# Patient Record
Sex: MALE | Race: WHITE
[De-identification: names, ages, dates, MRNs, and addresses within clinical notes are randomized per-mention and may not be internally consistent; named-entity substitution may affect disease eponyms.]

---

## 2017-06-08 NOTE — CT
CT abdomen and pelvis

 

Technique: Multiple axial sections were obtained from above the dome 

of the diaphragm inferiorly through the pubic symphysis.  Intravenous 

and oral contrast was not utilized.  Study has been performed as a 

ureteral stone protocol.

 

Findings: Left ureter is mildly prominent.  Slight inflammatory change

 is seen around portions of the left ureter.  These findings are due 

to an obstructing stone within the distal left ureter at the UVJ 

measuring 3-4 mm.  No other abnormal calcification seen along the 

course of the ureters.

 

Small nonobstructing stone noted within the right kidney measuring 

less than 5 mm.  Small cyst noted within the lower right kidney 

measuring about 1 cm.  No additional abnormality is seen within the 

kidneys.

 

Visualized lung bases are clear.  Noncontrast appearance of the liver 

and spleen appears within normal limits.  Pancreas appears within 

normal limits.  Aorta shows no aneurysmal dilatation with mild 

atherosclerotic change which continues into the iliac vessels.  

Gallbladder shows no calcified gallstones.  No retroperitoneal 

adenopathy or mesenteric abnormalities are seen.  Appendix felt to be 

seen and is within normal limits.  No pelvic mass or adenopathy is 

noted.  Small fat-containing left inguinal hernia is noted.

 

Bone window settings were reviewed which appear within normal limits 

for the patient's age.

 

Impression:

1.  Slightly prominent size of the left ureter with mild periurethral 

inflammatory change.  These findings are caused by an obstructing 3-4 

mm distal left ureteral stone at the UVJ.

2.  Small cyst and small nonobstructing stone within the right kidney.

3.  Other incidental findings as described above.  

 

Agree with preliminary report issued by [employer] 

(preliminary report dictated on 06/08/17, 1:03 PM Central Time)

 

Diagnostic code #3

## 2017-06-08 NOTE — EDM.PDOC
ED HPI GENERAL MEDICAL PROBLEM





- General


Chief Complaint: Abdominal Pain


Stated Complaint: Flank pain, abdominal pain


Time Seen by Provider: 06/08/17 11:10


Source of Information: Reports: Patient, Old Records (Obtained from [facility]), 

RN Notes Reviewed


History Limitations: Reports: No Limitations





- History of Present Illness


INITIAL COMMENTS - FREE TEXT/NARRATIVE: 





66 year old male presents to the ED today with complaints of intermittent, 

colicky, severe, left flank pain and LLQ abdominal pain. The pain started on 

Monday after a fall. He fell backwards, hitting his mid back on a railroad tie. 

The pain has been intermittent since that time. He was initially evaluated on 

Monday of this week by Dr. Romero at [facility]. He had a CT 

scan done at that time and was told he had a kidney stone and hernias. He 

states "I guess CT scans don't lie but I don't believe that's what's going on. I

've never had kidney stones before." He called [facility] and was 

instructed to come to the ED for evaluation. He was discharged home with 

flexeril and norco. He took both around 9am this morning. He says he was doing 

well and his pain had mostly subsided until this morning at which time he had a 

sudden onset of pain followed by nausea and vomiting x3. He has pain with 

urination, frequency, and is voiding small amounts. He has not noticed any 

hematuria. He denies testicle pain. He reports some loose stools but says he 

took a stool softener due to concerns of constipation while taking pain 

medication. No fever or chills. 


  ** Left Abdominal


Pain Score (Numeric/FACES): 5





- Related Data


 Allergies











Allergy/AdvReac Type Severity Reaction Status Date / Time


 


No Known Allergies Allergy   Verified 06/08/17 11:03











Home Meds: 


 Home Meds





Acetaminophen/HYDROcodone [Norco 325-5 MG] 1 tab PO Q4H PRN #15 tablet 06/08/17 

[Rx]


Hydrocodone/Acetaminophen [Hydrocodon-Acetaminophen 5-325] 1 tab PO Q4H PRN 06/ 08/17 [History]











Past Medical History


HEENT History: Reports: Impaired Vision


Other HEENT History: glasses


Gastrointestinal History: Reports: Hemorrhoids


Genitourinary History: Reports: Renal Calculus


Musculoskeletal History: Reports: Arthritis


Neurological History: Reports: Concussion, Vertigo





- Past Surgical History


HEENT Surgical History: Reports: Tonsillectomy





Social & Family History





- Family History


Family Medical History: Noncontributory





- Tobacco Use


Smoking Status *Q: Never Smoker


Second Hand Smoke Exposure: No





- Caffeine Use


Caffeine Use: Reports: Coffee, Soda


Caffeine Use Comment: Daily





- Recreational Drug Use


Recreational Drug Use: No





ED ROS GENERAL





- Review of Systems


Review Of Systems: See Below


Constitutional: Reports: No Symptoms.  Denies: Fever, Chills, Diaphoresis


Respiratory: Reports: No Symptoms.  Denies: Shortness of Breath


Cardiovascular: Reports: No Symptoms.  Denies: Chest Pain


GI/Abdominal: Reports: Abdominal Pain, Diarrhea, Nausea, Vomiting


: Reports: Dysuria, Flank Pain, Frequency, Pain.  Denies: Hematuria


Musculoskeletal: Reports: Back Pain


Neurological: Reports: No Symptoms.  Denies: Numbness, Tingling, Weakness





ED EXAM, GI/ABD





- Physical Exam


Exam: See Below


Exam Limited By: No Limitations


General Appearance: Alert, WD/WN, Anxious, Mild Distress


Respiratory/Chest: No Respiratory Distress, Lungs Clear, Normal Breath Sounds, 

No Accessory Muscle Use, Chest Non-Tender


Cardiovascular: Regular Rate, Rhythm, No Murmur


GI/Abdominal: Normal Bowel Sounds, Soft, No Organomegaly, No Distention, 

Tenderness (mild tenderness with palpation to LLQ and LUQ, no guarding, no 

peritoneal signs ).  No: Guarding, Rebound, Rigidity


Back Exam: Normal Inspection, Full Range of Motion.  No: CVA Tenderness (L), 

CVA Tenderness (R), Paraspinal Tenderness, Vertebral Tenderness


Neurological: Alert, Oriented, Normal Cognition


Psychiatric: Flat Affect


Skin Exam: Warm, Dry, Intact





Course





- Vital Signs


Last Recorded V/S: 


 Last Vital Signs











Temp  97.7 F   06/08/17 10:56


 


Pulse  69   06/08/17 10:56


 


Resp  20   06/08/17 10:56


 


BP  124/88   06/08/17 10:56


 


Pulse Ox  95   06/08/17 10:56














- Orders/Labs/Meds


Orders: 


 Active Orders 24 hr











 Category Date Time Status


 


 Peripheral IV Care [RC] .AS DIRECTED Care  06/08/17 11:26 Active


 


 Abdomen Pelvis wo Cont [CT] Stat Exams  06/08/17 11:26 Taken


 


 Sodium Chloride 0.9% [Saline Flush] Med  06/08/17 11:26 Active





 10 ml FLUSH ASDIRECTED PRN   


 


 Peripheral IV Insertion Adult [OM.PC] Stat Oth  06/08/17 11:26 Ordered








 Medication Orders





Sodium Chloride (Saline Flush)  10 ml FLUSH ASDIRECTED PRN


   PRN Reason: Keep Vein Open


   Last Admin: 06/08/17 11:57  Dose: 10 ml








Labs: 


 Laboratory Tests











  06/08/17 06/08/17 06/08/17 Range/Units





  11:30 11:50 11:50 


 


WBC   9.24 H   (4.23-9.07)  K/mm3


 


RBC   4.51 L   (4.63-6.08)  M/mm3


 


Hgb   13.9   (13.7-17.5)  gm/L


 


Hct   40.3   (40.1-51.0)  %


 


MCV   89.4   (79.0-92.2)  fl


 


MCH   30.8   (25.7-32.2)  pg


 


MCHC   34.5   (32.2-35.5)  g/dl


 


RDW Std Deviation   40.7   (35.1-43.9)  fL


 


Plt Count   198   (163-337)  K/mm3


 


MPV   9.8   (9.4-12.3)  fl


 


Neut % (Auto)   79.7 H   (34.0-67.9)  %


 


Lymph % (Auto)   9.3 L   (21.8-53.1)  %


 


Mono % (Auto)   8.9   (5.3-12.2)  %


 


Eos % (Auto)   1.5   (0.8-7.0)  


 


Baso % (Auto)   0.5   (0.1-1.2)  %


 


Neut # (Auto)   7.36 H   (1.78-5.38)  K/mm3


 


Lymph # (Auto)   0.86 L   (1.32-3.57)  K/mm3


 


Mono # (Auto)   0.82   (0.30-0.82)  K/mm3


 


Eos # (Auto)   0.14   (0.04-0.54)  K/mm3


 


Baso # (Auto)   0.05   (0.01-0.08)  K/mm3


 


Manual Slide Review   Abnormal smear   


 


Sodium    138  (136-145)  mEq/L


 


Potassium    4.1  (3.5-5.1)  mEq/L


 


Chloride    105  ()  mEq/L


 


Carbon Dioxide    25  (21-32)  mEq/L


 


Anion Gap    12.1  (5-15)  


 


BUN    25 H  (7-18)  mg/dL


 


Creatinine    1.5 H  (0.7-1.3)  mg/dL


 


Est Cr Clr Drug Dosing    44.31  mL/min


 


Estimated GFR (MDRD)    47  (>60)  mL/min


 


BUN/Creatinine Ratio    16.7  (14-18)  


 


Glucose    120 H  ()  mg/dL


 


Calcium    8.5  (8.5-10.1)  mg/dL


 


Total Bilirubin    0.4  (0.2-1.0)  mg/dL


 


AST    21  (15-37)  U/L


 


ALT    20  (16-63)  U/L


 


Alkaline Phosphatase    94  ()  U/L


 


Total Protein    7.4  (6.4-8.2)  g/dl


 


Albumin    3.6  (3.4-5.0)  g/dl


 


Globulin    3.8  gm/dL


 


Albumin/Globulin Ratio    1.0  (1-2)  


 


Urine Color  Yellow    (Yellow)  


 


Urine Appearance  Clear    (Clear)  


 


Urine pH  7.0    (5.0-8.0)  


 


Ur Specific Gravity  1.020    (1.005-1.030)  


 


Urine Protein  Trace H    (Negative)  


 


Urine Glucose (UA)  Negative    (Negative)  


 


Urine Ketones  Negative    (Negative)  


 


Urine Occult Blood  Trace-intact H    (Negative)  


 


Urine Nitrite  Negative    (Negative)  


 


Urine Bilirubin  Negative    (Negative)  


 


Urine Urobilinogen  0.2    (0.2-1.0)  


 


Ur Leukocyte Esterase  Negative    (Negative)  


 


Urine RBC  5-10 H    (0-5)  /hpf


 


Urine WBC  0-5    (0-5)  /hpf


 


Ur Epithelial Cells  Not seen    (0-5)  /hpf


 


Urine Bacteria  Not seen    (FEW)  /hpf


 


Urine Mucus  Few    (FEW)  /hpf











Meds: 


Medications











Generic Name Dose Route Start Last Admin





  Trade Name Freq  PRN Reason Stop Dose Admin


 


Sodium Chloride  10 ml  06/08/17 11:26  06/08/17 11:57





  Saline Flush  FLUSH   10 ml





  ASDIRECTED PRN   Administration





  Keep Vein Open   














Discontinued Medications














Generic Name Dose Route Start Last Admin





  Trade Name Freq  PRN Reason Stop Dose Admin


 


Ketorolac Tromethamine  30 mg  06/08/17 11:26  06/08/17 11:58





  Toradol  IVPUSH  06/08/17 11:27  30 mg





  ONETIME ONE   Administration


 


Ondansetron HCl  4 mg  06/08/17 11:26  06/08/17 11:56





  Zofran  IVPUSH  06/08/17 11:27  4 mg





  ONETIME ONE   Administration














- Re-Assessments/Exams


Free Text/Narrative Re-Assessment/Exam: 





CT report obtained from [facility]. CT abdomen/pelvis was obtained with oral 

contrast only on 6/5/17. Impression:


No evidence of appendicitis, diverticulitis, or hydronephrosis. Small calyceal 

tip stone in the midpole of the right kidney. Small fat containing inguinal 

hernia is noted on the left greater than the right. 








06/08/17 1245


CBC is unremarkable. CMP reveals mildly elevated kidney function and glucose, 

otherwise normal. UA positive for hematuria. 





CT of abdomen/pelvis without contrast read by [facility], impression:


1. mild left perinephric stranding and hydronephrosis secondary to a 4mm 

calculus located at or just inside the left ureterovesical junction. 





Patient was notified of findings. Images of kidney stones was provided for 

education purposes. The patient was educated no supportive care and f/u 

instructions. He has about 5 pills left of [place] from his clinic visit on 

Monday. Will provide him with a refill. He was educated on safety precautions 

regarding opiate pain medication. Discharge instructions as documented. 





Departure





- Departure


Time of Disposition: 12:56


Disposition: Home, Self-Care 01


Condition: good


Clinical Impression: 


 Kidney stone on left side





Back strain


Qualifiers:


 Encounter type: initial encounter Qualified Code(s): S39.012A - Strain of 

muscle, fascia and tendon of lower back, initial encounter








- Discharge Information


Prescriptions: 


Acetaminophen/HYDROcodone [Norco 325-5 MG] 1 tab PO Q4H PRN #15 tablet


 PRN Reason: Pain


Instructions:  Kidney Stones, Easy-to-Read


Referrals: 


Lucius Mack MD [Primary Care Provider] - 


Forms:  ED Department Discharge


Additional Instructions: 


Drink at least 80 oz of water per day


Ibuprofen 600-800mg every 8 hours as needed for mild to moderate pain


Hydrocodone/apap 1-2 tabs every 4-6 hours as needed for more severe pain


Follow-up in clinic or ER if you have not passed the stone in 3-4 days 


Return to ER if you are unable to manage your pain at home 


No driving for at least 6-8 hours after taking your pain medication or muscle 

relaxer














- My Orders


Last 24 Hours: 


My Active Orders





06/08/17 11:26


Peripheral IV Care [RC] .AS DIRECTED 


Abdomen Pelvis wo Cont [CT] Stat 


Sodium Chloride 0.9% [Saline Flush]   10 ml FLUSH ASDIRECTED PRN 


Peripheral IV Insertion Adult [OM.PC] Stat 














- Assessment/Plan


Last 24 Hours: 


My Active Orders





06/08/17 11:26


Peripheral IV Care [RC] .AS DIRECTED 


Abdomen Pelvis wo Cont [CT] Stat 


Sodium Chloride 0.9% [Saline Flush]   10 ml FLUSH ASDIRECTED PRN 


Peripheral IV Insertion Adult [OM.PC] Stat

## 2018-12-27 ENCOUNTER — HOSPITAL ENCOUNTER (EMERGENCY)
Dept: HOSPITAL 41 - JD.ED | Age: 68
Discharge: HOME | End: 2018-12-27
Payer: COMMERCIAL

## 2018-12-27 VITALS — DIASTOLIC BLOOD PRESSURE: 111 MMHG | SYSTOLIC BLOOD PRESSURE: 141 MMHG
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
## 2018-12-27 DIAGNOSIS — S20.219A: Primary | ICD-10-CM

## 2018-12-27 PROCEDURE — 99283 EMERGENCY DEPT VISIT LOW MDM: CPT

## 2018-12-27 PROCEDURE — 71046 X-RAY EXAM CHEST 2 VIEWS: CPT

## 2018-12-27 NOTE — EDM.PDOC
ED HPI GENERAL MEDICAL PROBLEM





- General


Chief Complaint: Chest Pain


Stated Complaint: CHEST INJURY


Time Seen by Provider: 12/27/18 08:44


Source of Information: Reports: Patient, RN Notes Reviewed





- History of Present Illness


INITIAL COMMENTS - FREE TEXT/NARRATIVE: 





68-year-old male was pushing snow with a skid steer type  when he ran 

into the curb at the edge of a parking lot unexpectedly. He was going quite 

fast. This threw his chest against a protective bar that goes across the seat 

in front of him anterior to his chest.  He has had anterior chest discomfort 

since this happened worse with deep breathing and worse with certain types of 

motion. No abdominal pain nausea vomiting. No unusual weakness dizziness.


  ** Chest


Pain Score (Numeric/FACES): 4





- Related Data


 Allergies











Allergy/AdvReac Type Severity Reaction Status Date / Time


 


No Known Allergies Allergy   Verified 12/27/18 08:47











Home Meds: 


 Home Meds





. [No Known Home Meds]  12/27/18 [History]











Past Medical History


HEENT History: Reports: Impaired Vision


Other HEENT History: glasses


Gastrointestinal History: Reports: Hemorrhoids


Genitourinary History: Reports: Renal Calculus


Musculoskeletal History: Reports: Arthritis


Neurological History: Reports: Concussion, Vertigo





- Past Surgical History


HEENT Surgical History: Reports: Tonsillectomy





Social & Family History





- Family History


Family Medical History: Noncontributory





- Caffeine Use


Caffeine Use: Reports: Coffee, Soda


Caffeine Use Comment: Daily





ED ROS GENERAL





- Review of Systems


Review Of Systems: See Below


Constitutional: Denies: Diaphoresis


HEENT: Reports: No Symptoms


Respiratory: Reports: Pleuritic Chest Pain.  Denies: Shortness of Breath, 

Wheezing


Cardiovascular: Reports: Chest Pain


GI/Abdominal: Denies: Abdominal Pain (Anterior chest), Nausea, Vomiting


Musculoskeletal: Denies: Neck Pain, Back Pain, Joint Pain


Skin: Reports: No Symptoms


Neurological: Reports: No Symptoms.  Denies: Dizziness





ED EXAM, GENERAL





- Physical Exam


Exam: See Below


General Appearance: Alert, Mild Distress


Eye Exam: Bilateral Eye: PERRL


Throat/Mouth: Normal Inspection


Head: Atraumatic


Neck: Supple


Respiratory/Chest: No Respiratory Distress, Lungs Clear, Normal Breath Sounds, 

Other (There is mild tenderness of the anterior chest, no severe rib or sternal 

tenderness, no bruising or swelling visible)


Cardiovascular: Regular Rate, Rhythm


GI/Abdominal: Soft, Non-Tender.  No: Guarding, Rebound


Back Exam: No: CVA Tenderness (L), CVA Tenderness (R)


Extremities: Normal Inspection


Neurological: Alert, Oriented, No Motor/Sensory Deficits


Skin Exam: Warm, Dry, Normal Color





Course





- Vital Signs


Last Recorded V/S: 


 Last Vital Signs











Temp  98.1 F   12/27/18 10:25


 


Pulse  67   12/27/18 10:25


 


Resp  12   12/27/18 10:25


 


BP  141/111 H  12/27/18 10:25


 


Pulse Ox  97   12/27/18 10:25














- Orders/Labs/Meds


Orders: 


 Active Orders 24 hr











 Category Date Time Status


 


 CXR [Chest 2V] [CR] Stat Exams  12/27/18 09:05 Taken











Meds: 


Medications














Discontinued Medications














Generic Name Dose Route Start Last Admin





  Trade Name Odilon  PRN Reason Stop Dose Admin


 


Acetaminophen  975 mg  12/27/18 09:04  12/27/18 09:23





  Tylenol  PO  12/27/18 09:05  975 mg





  NOW ONE   Administration





     





     





     





     














- Re-Assessments/Exams


Free Text/Narrative Re-Assessment/Exam: 





12/27/18 17:18


Chest x-ray looks good, sats are good, he feels better after Tylenol given, CT 

of chest not clinically indicated at this time, he will return if symptoms 

worsen. Discharge instructions as documented.





Departure





- Departure


Time of Disposition: 09:49


Disposition: Home, Self-Care 01


Condition: Fair


Clinical Impression: 


Chest wall contusion


Qualifiers:


 Encounter type: initial encounter Laterality: unspecified laterality Qualified 

Code(s): S20.219A - Contusion of unspecified front wall of thorax, initial 

encounter





Instructions:  Chest Contusion, Adult, Easy-to-Read


Referrals: 


Lucius Mack MD [Primary Care Provider] - 


Forms:  ED Department Discharge


Additional Instructions: 


Rest, avoid heavy lifting, you have been given Tylenol while here in the ED, 

YOur chest Xray looks good. Continue Tylenol up to 3 times daily, you may take 

Advil or ibuprofen in between doses of Tylenol if needed for extra pain relief, 

may also alternate ice and heat as needed, follow-up clinic if not getting back 

to normal within 5-7 days as expected, return to ED as needed if symptoms 

worsening in any way.





- My Orders


Last 24 Hours: 


My Active Orders





12/27/18 09:05


CXR [Chest 2V] [CR] Stat 














- Assessment/Plan


Last 24 Hours: 


My Active Orders





12/27/18 09:05


CXR [Chest 2V] [CR] Stat

## 2018-12-28 NOTE — CR
Chest: PA and lateral views of the chest were obtained.

 

Comparison: No prior chest x-ray.

 

Heart size and mediastinum are normal.  Slight atelectasis is seen 

within the left base.  Lungs otherwise are clear.  Bony structures 

show slight degenerative spurring within the spine.  Mild kyphosis is 

present within the spine.  Diaphragms are slightly flattened on the 

lateral view suggesting mild emphysematous change.

 

Impression:

1.  Incidental findings as noted above.  Nothing acute is appreciated.

 

Diagnostic code #2

## 2020-02-26 NOTE — EDM.PDOCBH
ED HPI GENERAL MEDICAL PROBLEM





- General


Chief Complaint: Behavioral/Psych


Stated Complaint: ANXIETY AND SUICIDAL IDEATIONS


Time Seen by Provider: 02/26/20 13:03


Source of Information: Reports: Patient, RN Notes Reviewed


History Limitations: Reports: No Limitations





- History of Present Illness


INITIAL COMMENTS - FREE TEXT/NARRATIVE: 





Patient is a 69-year-old male who presents to the ED for the evaluation of some 

suicidal ideations.  The patient states that his wife recently passed away in 

September 2019, and she was his "life buddy".  He has been having an 

exceptionally rough time since the passing of his wife.  He also notes that he 

has been to 7 funerals since his wife has passed, and he he just is having a 

hard time coping with things at this time.  He notes that he has been having a 

lot of mental anguish, and also a lot of physical pain as he has arthritis, 

both knees are shot.  He does PT on a regular basis, this does not provide him 

with much relief.  The patient states that he had a neighbor who was worried 

about him, that took him or referred him to [facility] for management, but badly 

and sent him here for further evaluation.  Patient does state that he has 

access to multiple guns and knives at home, but he does not have a set plan or 

know how he would commit suicide if he were to do so.  He is not hearing or 

seeing things that are not there as well.  He states that he went over to the 

neighbor's house, just because he was not sure what to do anymore.  He is alert 

and oriented, calm and cooperative.  His primary care provider is Dr. Tadeo.


  ** Headache


Pain Score (Numeric/FACES): 6





- Related Data


 Allergies











Allergy/AdvReac Type Severity Reaction Status Date / Time


 


No Known Allergies Allergy   Verified 02/26/20 12:51











Home Meds: 


 Home Meds





LORazepam [Ativan] 1 mg PO BID 02/26/20 [History]


Rosuvastatin [Crestor] 2.5 mg PO DAILY 02/26/20 [History]


Sertraline [Zoloft] 50 mg PO BEDTIME 02/26/20 [History]











Past Medical History


HEENT History: Reports: Impaired Vision


Other HEENT History: glasses


Cardiovascular History: Reports: High Cholesterol


Gastrointestinal History: Reports: Hemorrhoids


Genitourinary History: Reports: Renal Calculus


Musculoskeletal History: Reports: Arthritis


Neurological History: Reports: Concussion, Vertigo


Psychiatric History: Reports: Anxiety, Depression, Suicidal Ideation





- Past Surgical History


HEENT Surgical History: Reports: Tonsillectomy





Social & Family History





- Family History


Family Medical History: Noncontributory





- Tobacco Use


Smoking Status *Q: Never Smoker





- Caffeine Use


Caffeine Use: Reports: Coffee


Caffeine Use Comment: Daily





- Recreational Drug Use


Recreational Drug Use: No





ED ROS GENERAL





- Review of Systems


Review Of Systems: See Below


Constitutional: Denies: Fever, Chills


HEENT: Denies: Throat Pain


Respiratory: Denies: Shortness of Breath


Cardiovascular: Denies: Chest Pain


GI/Abdominal: Denies: Abdominal Pain, Constipation, Diarrhea, Nausea, Vomiting


: Denies: Dysuria


Neurological: Reports: Headache.  Denies: Confusion


Psychiatric: Reports: Anxiety, Depression, Suicidal Ideation (with no concrete 

plan).  Denies: Hallucinations, Homicidal Ideation





ED EXAM, BEHAVIORAL HEALTH





- Physical Exam


Exam: See Below


Exam Limited By: No Limitations


General Appearance: Alert, WD/WN, No Apparent Distress


Eye Exam: Bilateral Eye: EOMI, Normal Inspection, PERRL


Ears: Normal External Exam


Nose: Normal Inspection


Throat/Mouth: Normal Inspection, Normal Lips, Normal Teeth, Normal Gums, Normal 

Oropharynx, Normal Voice, No Airway Compromise


Head: Atraumatic, Normocephalic


Neck: Normal Inspection


Respiratory/Chest: No Respiratory Distress, Lungs Clear, Normal Breath Sounds, 

No Accessory Muscle Use, Chest Non-Tender


Cardiovascular: Normal Peripheral Pulses, Regular Rate, Rhythm, No Murmur


GI/Abdominal: Normal Bowel Sounds, Soft, Non-Tender, No Distention, No Mass


Extremities: Normal Inspection, Normal Capillary Refill


Neurological: Alert, Normal Mood/Affect, CN II-XII Intact, Normal Cognition, 

Normal Gait, Normal Reflexes, No Motor/Sensory Deficits, Oriented x 3


Psychiatric: Alert, Normal Affect, Normal Cognition, Normal Mood, Oriented, 

Tearful (at times when he is recalling his wife's memory), Suicidal Thoughts.  

No: Suicidal Plan, Auditory Hallucinations, Visual Hallucinations, Paranoid 

Thoughts, Threatening Behavior


Skin Exam: Warm, Dry, Intact, Normal color, No rash





COURSE, BEHAVIORAL HEALTH COMP





- Course


Vital Signs: 


 Last Vital Signs











Temp  97.3 F   02/26/20 12:46


 


Pulse  66   02/26/20 12:46


 


Resp  16   02/26/20 12:46


 


BP  153/84 H  02/26/20 12:46


 


Pulse Ox  96   02/26/20 12:46











Orders, Labs, Meds: 


 Active Orders 24 hr











 Category Date Time Status


 


 EKG Documentation Completion [RC] STAT Care  02/26/20 13:03 Ordered


 


 DRUG SCREEN, URINE [URCHEM] Stat Lab  02/26/20 13:03 Ordered








 Laboratory Tests











  02/26/20 02/26/20 02/26/20 Range/Units





  13:15 13:15 13:15 


 


WBC  15.36 H    (4.23-9.07)  K/mm3


 


RBC  4.71    (4.63-6.08)  M/mm3


 


Hgb  14.6    (13.7-17.5)  gm/dl


 


Hct  41.9    (40.1-51.0)  %


 


MCV  89.0    (79.0-92.2)  fl


 


MCH  31.0    (25.7-32.2)  pg


 


MCHC  34.8    (32.2-35.5)  g/dl


 


RDW Std Deviation  42.6    (35.1-43.9)  fL


 


Plt Count  235    (163-337)  K/mm3


 


MPV  8.8 L    (9.4-12.3)  fl


 


Neutrophils % (Manual)  86 H    (40-60)  %


 


Band Neutrophils %  0    (0-10)  %


 


Lymphocytes % (Manual)  9 L    (20-40)  %


 


Atypical Lymphs %  0    %


 


Monocytes % (Manual)  4    (2-10)  %


 


Eosinophils % (Manual)  1    (0.8-7.0)  %


 


Basophils % (Manual)  0 L    (0.2-1.2)  


 


Platelet Estimate  Adequate    


 


RBC Morph Comment  Normal    


 


Sodium   138   (136-145)  mEq/L


 


Potassium   4.0   (3.5-5.1)  mEq/L


 


Chloride   102   ()  mEq/L


 


Carbon Dioxide   24   (21-32)  mEq/L


 


Anion Gap   16.0 H   (5-15)  


 


BUN   21 H   (7-18)  mg/dL


 


Creatinine   1.3   (0.7-1.3)  mg/dL


 


Est Cr Clr Drug Dosing   49.08   mL/min


 


Estimated GFR (MDRD)   55   (>60)  mL/min


 


BUN/Creatinine Ratio   16.2   (14-18)  


 


Glucose   99   ()  mg/dL


 


Calcium   9.4   (8.5-10.1)  mg/dL


 


Total Bilirubin   0.7   (0.2-1.0)  mg/dL


 


AST   63 H   (15-37)  U/L


 


ALT   63   (16-63)  U/L


 


Alkaline Phosphatase   95   ()  U/L


 


Total Protein   7.8   (6.4-8.2)  g/dl


 


Albumin   3.9   (3.4-5.0)  g/dl


 


Globulin   3.9   gm/dL


 


Albumin/Globulin Ratio   1.0   (1-2)  


 


TSH 3rd Generation   1.107   (0.358-3.74)  uIU/mL


 


Salicylates    1.5 L  (2.8-20)  mg/dL


 


Acetaminophen   0 L   (10-30)  ug/mL


 


Ethyl Alcohol   0.00   (0.00)  gm%








Medications














Discontinued Medications














Generic Name Dose Route Start Last Admin





  Trade Name Freq  PRN Reason Stop Dose Admin


 


Ketorolac Tromethamine  60 mg  02/26/20 14:34  02/26/20 15:01





  Toradol  IM  02/26/20 14:35  60 mg





  ONETIME ONE   Administration





     





     





     





     











Discharge vs Psych Eval/Treatment:: 





02/26/20 14:39


Patient presents to the ED with [facility] staff for the evaluation of his 

suicidal ideations.  At this time the patient is not having any sort of 

suicidal plan.  I do believe that he is quite depressed, and I did urge him to 

get counseling or start counseling if he is not already done so.  He does agree 

with this at this time.  He states he is not in a state where he would want to 

have inpatient psychiatric admission, and I do agree with him.  He does have a 

cousin present in the room, and I told the cousin that he will have to go to 

the patient's house and take away the guns for the next day or 2 so the patient 

does not have access to this, the patient is okay with this plan as well.  I 

had advised the patient to go to [facility] in the morning for open enrollment, 

and he does agree to do so to start psychiatric management.





Departure





- Departure


Time of Disposition: 14:40


Disposition: Home, Self-Care 01


Condition: Fair


Clinical Impression: 


 Passive suicidal ideations, Anxiety








- Discharge Information


*PRESCRIPTION DRUG MONITORING PROGRAM REVIEWED*: No


*COPY OF PRESCRIPTION DRUG MONITORING REPORT IN PATIENT AVIS: No


Instructions:  Suicidal Feelings: How to Help Yourself, Stress


Referrals: 


Lucius Mack MD [Primary Care Provider] - 


Forms:  ED Department Discharge


Additional Instructions: 


You were evaluated in the ER today regarding your suicidal ideations.





At this time you do not need inpatient psychiatric admission.  Highly recommend 

that you go to [facility], tomorrow, at 8 AM for open enrollment, 

see you can start counseling services and other psychiatric services through 

them.





Please keep taking all of your prior medications as previously prescribed.  It 

looks as if you already have a prescription for Ativan, if you have not 

refilled this in a while, please refill this at your pharmacy and take as 

directed, this should help you with sleep tonight.





As for your headaches, recommend you take 600 mg ibuprofen every 6 hours as 

needed for further pain relief.





You may present to [facility] [place], if you feel you need further help, or you 

can call their emergency crisis line at [phone].  





Also please do not hesitate to return the ER if your suicidal thoughts worsen, 

or if you develop a plan, as this would warrant further and emergent management.





Sepsis Event Note





- Evaluation


Sepsis Screening Result: No Definite Risk





- Focused Exam


Vital Signs: 


 Vital Signs











  Temp Pulse Resp BP Pulse Ox


 


 02/26/20 12:46  97.3 F  66  16  153/84 H  96











Date Exam was Performed: 02/26/20


Time Exam was Performed: 15:22





- My Orders


Last 24 Hours: 


My Active Orders





02/26/20 13:03


EKG Documentation Completion [RC] STAT 


DRUG SCREEN, URINE [URCHEM] Stat 














- Assessment/Plan


Last 24 Hours: 


My Active Orders





02/26/20 13:03


EKG Documentation Completion [RC] STAT 


DRUG SCREEN, URINE [URCHEM] Stat